# Patient Record
Sex: MALE | Race: WHITE | NOT HISPANIC OR LATINO | Employment: FULL TIME | URBAN - METROPOLITAN AREA
[De-identification: names, ages, dates, MRNs, and addresses within clinical notes are randomized per-mention and may not be internally consistent; named-entity substitution may affect disease eponyms.]

---

## 2017-08-07 ENCOUNTER — TRANSCRIBE ORDERS (OUTPATIENT)
Dept: ADMINISTRATIVE | Facility: HOSPITAL | Age: 52
End: 2017-08-07

## 2017-08-07 ENCOUNTER — APPOINTMENT (OUTPATIENT)
Dept: LAB | Facility: MEDICAL CENTER | Age: 52
End: 2017-08-07
Payer: COMMERCIAL

## 2017-08-07 DIAGNOSIS — Z00.8 ENCOUNTER FOR OTHER GENERAL EXAMINATION: ICD-10-CM

## 2017-08-07 DIAGNOSIS — Z00.8 ENCOUNTER FOR OTHER GENERAL EXAMINATION: Primary | ICD-10-CM

## 2017-08-07 LAB
CHOLEST SERPL-MCNC: 181 MG/DL (ref 50–200)
EST. AVERAGE GLUCOSE BLD GHB EST-MCNC: 117 MG/DL
HBA1C MFR BLD: 5.7 % (ref 4.2–6.3)
HDLC SERPL-MCNC: 52 MG/DL (ref 40–60)
LDLC SERPL CALC-MCNC: 108 MG/DL (ref 0–100)
TRIGL SERPL-MCNC: 106 MG/DL

## 2017-08-07 PROCEDURE — 36415 COLL VENOUS BLD VENIPUNCTURE: CPT

## 2017-08-07 PROCEDURE — 83036 HEMOGLOBIN GLYCOSYLATED A1C: CPT

## 2017-08-07 PROCEDURE — 80061 LIPID PANEL: CPT

## 2017-09-24 ENCOUNTER — OFFICE VISIT (OUTPATIENT)
Dept: URGENT CARE | Facility: MEDICAL CENTER | Age: 52
End: 2017-09-24
Payer: COMMERCIAL

## 2017-09-24 PROCEDURE — G0382 LEV 3 HOSP TYPE B ED VISIT: HCPCS

## 2017-09-24 PROCEDURE — 99283 EMERGENCY DEPT VISIT LOW MDM: CPT

## 2018-08-23 ENCOUNTER — APPOINTMENT (OUTPATIENT)
Dept: LAB | Facility: MEDICAL CENTER | Age: 53
End: 2018-08-23

## 2018-08-23 ENCOUNTER — TRANSCRIBE ORDERS (OUTPATIENT)
Dept: ADMINISTRATIVE | Facility: HOSPITAL | Age: 53
End: 2018-08-23

## 2018-08-23 DIAGNOSIS — Z00.8 ENCOUNTER FOR OTHER GENERAL EXAMINATION: ICD-10-CM

## 2018-08-23 DIAGNOSIS — Z00.8 ENCOUNTER FOR OTHER GENERAL EXAMINATION: Primary | ICD-10-CM

## 2018-08-23 LAB
CHOLEST SERPL-MCNC: 171 MG/DL (ref 50–200)
EST. AVERAGE GLUCOSE BLD GHB EST-MCNC: 111 MG/DL
HBA1C MFR BLD: 5.5 % (ref 4.2–6.3)
HDLC SERPL-MCNC: 57 MG/DL (ref 40–60)
LDLC SERPL CALC-MCNC: 101 MG/DL (ref 0–100)
NONHDLC SERPL-MCNC: 114 MG/DL
TRIGL SERPL-MCNC: 63 MG/DL

## 2018-08-23 PROCEDURE — 80061 LIPID PANEL: CPT

## 2018-08-23 PROCEDURE — 36415 COLL VENOUS BLD VENIPUNCTURE: CPT

## 2018-08-23 PROCEDURE — 83036 HEMOGLOBIN GLYCOSYLATED A1C: CPT

## 2019-08-27 ENCOUNTER — OFFICE VISIT (OUTPATIENT)
Dept: URGENT CARE | Facility: CLINIC | Age: 54
End: 2019-08-27
Payer: COMMERCIAL

## 2019-08-27 VITALS
RESPIRATION RATE: 18 BRPM | BODY MASS INDEX: 37.1 KG/M2 | HEIGHT: 71 IN | WEIGHT: 265 LBS | HEART RATE: 76 BPM | DIASTOLIC BLOOD PRESSURE: 84 MMHG | TEMPERATURE: 100.1 F | OXYGEN SATURATION: 97 % | SYSTOLIC BLOOD PRESSURE: 120 MMHG

## 2019-08-27 DIAGNOSIS — J02.9 PHARYNGITIS, UNSPECIFIED ETIOLOGY: Primary | ICD-10-CM

## 2019-08-27 LAB — S PYO AG THROAT QL: NEGATIVE

## 2019-08-27 PROCEDURE — 87070 CULTURE OTHR SPECIMN AEROBIC: CPT | Performed by: PHYSICIAN ASSISTANT

## 2019-08-27 PROCEDURE — 87880 STREP A ASSAY W/OPTIC: CPT | Performed by: PHYSICIAN ASSISTANT

## 2019-08-27 PROCEDURE — 99213 OFFICE O/P EST LOW 20 MIN: CPT | Performed by: PHYSICIAN ASSISTANT

## 2019-08-27 NOTE — LETTER
August 27, 2019     Patient: Mary Castanon   YOB: 1965   Date of Visit: 8/27/2019       To Whom It May Concern:    Patient seen in office today for acute illness  No work until without fever for 24 hours without having to take anti fever medication           Sincerely,        Dutch Vivar PA-C    CC: No Recipients

## 2019-08-27 NOTE — PROGRESS NOTES
[facility]    NAME: Eric Lacy is a 62 y o  male  : 1965    MRN: [id number]  DATE: 2019  TIME: 3:05 PM    Assessment and Plan   Pharyngitis, unspecified etiology [J02 9]  1  Pharyngitis, unspecified etiology  POCT rapid strepA    Throat culture       Patient Instructions     Patient Instructions   1  Rapid strep test was negative  No antibiotic indicated at this time  2  Throat swab will be sent for definitive culture  Results take approximately 48-72 hours to return  If you have not heard from the provider by the end of 3 business days, please call phone number at top of clinical summary to request the results  3  In the meantime you may do warm salt water gargles every 2-3 hours while awake; use  throat lozenges;  take Tylenol or Ibuprofen (as long as not contraindicated) as needed for sore throat symptoms  4   If significant worsening of throat pain, difficulty breathing, unable to swallow to the point of drooling, or "hot potato" voice proceed to ER for immediate medical attention  5   If sore throat is accompanied by any post nasal drip, nasal congestion, runny nose, sinus pressure, and / or cough may try over the counter cold medicine for symptom relief  These symptoms if they do occur usually peak around 8-10 days then slowly resolve over a couple weeks  Please note, yellow or green mucus does not always / typically mean bacterial infection  It can mean dehydrated mucous or mucous filled with old white blood cells that have been fighting your infection  Rest   Fluids  OTC cough and cold if needed  Chief Complaint     Chief Complaint   Patient presents with    Sore Throat     SOre thraot, fever , chills and over all body aches for the past day       History of Present Illness   Eric Lacy presents to the clinic c/o  61-year-old male woke up yesterday morning with terrible sore throat    Today's some fevers and chills with some postnasal drainage and a little bit of nasal discharge  Body aches and pains  Works at [employer]  No known exposures  Works basically by himself  No history of any travel  Review of Systems   Review of Systems   Constitutional: Positive for activity change, appetite change, chills, fatigue and fever  HENT: Positive for ear pain, postnasal drip, rhinorrhea, sinus pain, sore throat and trouble swallowing  Negative for congestion, ear discharge, facial swelling, sinus pressure and sneezing  Eyes: Negative  Respiratory: Negative  Cardiovascular: Negative  Gastrointestinal: Negative  Musculoskeletal: Positive for back pain and myalgias  Skin: Negative for rash  Hematological: Negative  Current Medications     No long-term medications on file  Current Allergies     Allergies as of 08/27/2019    (No Known Allergies)          The following portions of the patient's history were reviewed and updated as appropriate: allergies, current medications, past family history, past medical history, past social history, past surgical history and problem list   No past medical history on file  No past surgical history on file  No family history on file  Objective   /84   Pulse 76   Temp 100 1 °F (37 8 °C) (Tympanic)   Resp 18   Ht 5' 11" (1 803 m)   Wt 120 kg (265 lb)   SpO2 97%   BMI 36 96 kg/m²   No LMP for male patient  Physical Exam     Physical Exam   Constitutional: He is oriented to person, place, and time  He appears well-developed and well-nourished  Non-toxic appearance  He appears ill  No distress  Appears mildly ill   HENT:   Head: Normocephalic and atraumatic  Right Ear: External ear and ear canal normal  No drainage, swelling or tenderness  No middle ear effusion  Left Ear: External ear and ear canal normal  No drainage, swelling or tenderness  No middle ear effusion  Mouth/Throat: Uvula is midline and mucous membranes are normal  No uvula swelling   Posterior oropharyngeal edema and posterior oropharyngeal erythema present  No oropharyngeal exudate or tonsillar abscesses  No tonsillar exudate  Slight retraction bilateral TMs  Cobblestoning and patchy erythema posterior pharynx   Eyes: Pupils are equal, round, and reactive to light  Conjunctivae and EOM are normal  Right eye exhibits no discharge  Left eye exhibits no discharge  No scleral icterus  Neck: Normal range of motion  Neck supple  No tracheal deviation present  Cardiovascular: Normal rate, regular rhythm, normal heart sounds and intact distal pulses  Exam reveals no gallop and no friction rub  No murmur heard  Pulmonary/Chest: Effort normal and breath sounds normal  No stridor  No respiratory distress  He has no wheezes  He has no rhonchi  He has no rales  Lymphadenopathy:     He has no cervical adenopathy  Neurological: He is alert and oriented to person, place, and time  Skin: Skin is warm and dry  No rash noted  He is not diaphoretic  Psychiatric: He has a normal mood and affect

## 2019-08-27 NOTE — PATIENT INSTRUCTIONS
1  Rapid strep test was negative  No antibiotic indicated at this time  2  Throat swab will be sent for definitive culture  Results take approximately 48-72 hours to return  If you have not heard from the provider by the end of 3 business days, please call phone number at top of clinical summary to request the results  3  In the meantime you may do warm salt water gargles every 2-3 hours while awake; use  throat lozenges;  take Tylenol or Ibuprofen (as long as not contraindicated) as needed for sore throat symptoms  4   If significant worsening of throat pain, difficulty breathing, unable to swallow to the point of drooling, or "hot potato" voice proceed to ER for immediate medical attention  5   If sore throat is accompanied by any post nasal drip, nasal congestion, runny nose, sinus pressure, and / or cough may try over the counter cold medicine for symptom relief  These symptoms if they do occur usually peak around 8-10 days then slowly resolve over a couple weeks  Please note, yellow or green mucus does not always / typically mean bacterial infection  It can mean dehydrated mucous or mucous filled with old white blood cells that have been fighting your infection  Rest   Fluids  OTC cough and cold if needed

## 2019-08-29 LAB — BACTERIA THROAT CULT: NORMAL

## 2019-08-30 ENCOUNTER — TELEPHONE (OUTPATIENT)
Dept: URGENT CARE | Facility: CLINIC | Age: 54
End: 2019-08-30

## 2020-01-23 ENCOUNTER — OFFICE VISIT (OUTPATIENT)
Dept: URGENT CARE | Facility: CLINIC | Age: 55
End: 2020-01-23
Payer: COMMERCIAL

## 2020-01-23 VITALS
HEART RATE: 63 BPM | TEMPERATURE: 97.4 F | HEIGHT: 71 IN | WEIGHT: 281 LBS | DIASTOLIC BLOOD PRESSURE: 75 MMHG | BODY MASS INDEX: 39.34 KG/M2 | OXYGEN SATURATION: 96 % | RESPIRATION RATE: 20 BRPM | SYSTOLIC BLOOD PRESSURE: 115 MMHG

## 2020-01-23 DIAGNOSIS — H60.392 OTHER INFECTIVE ACUTE OTITIS EXTERNA OF LEFT EAR: Primary | ICD-10-CM

## 2020-01-23 PROCEDURE — 99213 OFFICE O/P EST LOW 20 MIN: CPT | Performed by: NURSE PRACTITIONER

## 2020-01-23 RX ORDER — CEPHALEXIN 500 MG/1
500 CAPSULE ORAL EVERY 8 HOURS SCHEDULED
Qty: 21 CAPSULE | Refills: 0 | Status: SHIPPED | OUTPATIENT
Start: 2020-01-23 | End: 2020-01-30

## 2020-01-23 RX ORDER — NEOMYCIN SULFATE, POLYMYXIN B SULFATE AND HYDROCORTISONE 10; 3.5; 1 MG/ML; MG/ML; [USP'U]/ML
4 SUSPENSION/ DROPS AURICULAR (OTIC) 4 TIMES DAILY
Qty: 10 ML | Refills: 0 | Status: SHIPPED | OUTPATIENT
Start: 2020-01-23 | End: 2020-01-30

## 2020-01-23 NOTE — PATIENT INSTRUCTIONS
Otitis Externa   WHAT YOU NEED TO KNOW:   Otitis externa, or swimmer's ear, is an infection in the outer ear canal  This canal goes from the outside of the ear to the eardrum  DISCHARGE INSTRUCTIONS:   Return to the emergency department if:   · You have severe ear pain  · You are suddenly unable to hear at all  · You have new swelling in your face, behind your ears, or in your neck  · You suddenly cannot move part of your face  · Your face suddenly feels numb  Contact your healthcare provider if:   · You have a fever  · Your signs and symptoms do not get better after 2 days of treatment  · Your signs and symptoms go away for a time, but then come back  · You have questions or concerns about your condition or care  Medicines:   · NSAIDs , such as ibuprofen, help decrease swelling, pain, and fever  This medicine is available with or without a doctor's order  NSAIDs can cause stomach bleeding or kidney problems in certain people  If you take blood thinner medicine, always ask if NSAIDs are safe for you  Always read the medicine label and follow directions  Do not give these medicines to children under 10months of age without direction from your child's healthcare provider  · Acetaminophen  decreases pain and fever  It is available without a doctor's order  Ask how much to take and how often to take it  Follow directions  Acetaminophen can cause liver damage if not taken correctly  · Ear drops  that contain an antibiotic may be given  The antibiotic helps treat a bacterial infection  You may also be given steroid medicine  The steroid helps decrease redness, swelling, and pain  · Take your medicine as directed  Contact your healthcare provider if you think your medicine is not helping or if you have side effects  Tell him or her if you are allergic to any medicine  Keep a list of the medicines, vitamins, and herbs you take  Include the amounts, and when and why you take them  Bring the list or the pill bottles to follow-up visits  Carry your medicine list with you in case of an emergency  Follow up with your healthcare provider as directed:  Write down your questions so you remember to ask them during your visits  How to use eardrops:   · Lie down on your side with your infected ear facing up  · Carefully drip the correct number of eardrops into your ear  Have another person help you if possible  · Gently move the outside part of your ear back and forth to help the medicine reach your ear canal      · Stay lying down in the same position (with your ear facing up) for 3 to 5 minutes  Prevent otitis externa:   · Do not put cotton swabs or foreign objects in your ears  · Wrap a clean moist washcloth around your finger, and use it to clean your outer ear and remove extra ear wax  · Use ear plugs when you swim  Dry your outer ears completely after you swim or bathe  © 2017 [employer]  Information is for End User's use only and may not be sold, redistributed or otherwise used for commercial purposes  All illustrations and images included in CareNotes® are the copyrighted property of [employer]  or [employer]  The above information is an  only  It is not intended as medical advice for individual conditions or treatments  Talk to your doctor, nurse or pharmacist before following any medical regimen to see if it is safe and effective for you

## 2020-01-23 NOTE — PROGRESS NOTES
[facility]        NAME: Kaci Calix is a 62 y o  male  : 1965    MRN: [id number]  DATE: 2020  TIME: 6:41 PM    Assessment and Plan   Other infective acute otitis externa of left ear [H60 392]  1  Other infective acute otitis externa of left ear  neomycin-polymyxin-hydrocortisone (CORTISPORIN) 0 35%-10,000 units/mL-1% otic suspension    cephalexin (KEFLEX) 500 mg capsule     Start course of ear drops and oral antibiotics  rec clean ear plugs while at work   F/u with pcp or ER with worsening symptoms  Pt in agreement with plan     Patient Instructions     Follow up with PCP in 3-5 days  Proceed to  ER if symptoms worsen  Chief Complaint     Chief Complaint   Patient presents with   Sam Edward     Patient here with throbbing pain and itching in his left ear for two days  History of Present Illness   Kaci Calix presents to the clinic c/o    Pt states 2 days ago - ear started to bother him - painful on the side and started to itch  Then started to be tender, painful and tender  External ear  Works at [employer]   No fevers, chills, neck pain  No recent URIs      Review of Systems   Review of Systems   All other systems reviewed and are negative          Current Medications     Long-Term Medications   Medication Sig Dispense Refill    neomycin-polymyxin-hydrocortisone (CORTISPORIN) 0 35%-10,000 units/mL-1% otic suspension Administer 4 drops into the left ear 4 (four) times a day for 7 days 10 mL 0       Current Allergies     Allergies as of 2020    (No Known Allergies)            The following portions of the patient's history were reviewed and updated as appropriate: allergies, current medications, past family history, past medical history, past social history, past surgical history and problem list     Objective   /75 (BP Location: Left arm, Patient Position: Sitting, Cuff Size: Large)   Pulse 63   Temp (!) 97 4 °F (36 3 °C) (Tympanic)   Resp 20  5' 11" (1 803 m)   Wt 127 kg (281 lb)   SpO2 96%   BMI 39 19 kg/m²        Physical Exam     Physical Exam   Constitutional: Vital signs are normal  He appears well-developed and well-nourished  He is active  HENT:   Head: Normocephalic and atraumatic  Right Ear: Hearing, tympanic membrane, external ear and ear canal normal    Left Ear: Hearing, tympanic membrane and ear canal normal  There is swelling (and erythema) and tenderness (of canal and external ear)  Eyes: Pupils are equal, round, and reactive to light  Conjunctivae and lids are normal    Cardiovascular: Normal rate, regular rhythm, S1 normal, S2 normal and normal heart sounds  Pulmonary/Chest: Effort normal and breath sounds normal    Skin: Skin is warm, dry and intact  Psychiatric: He has a normal mood and affect  His speech is normal and behavior is normal  Judgment and thought content normal  Cognition and memory are normal    Nursing note and vitals reviewed

## 2020-01-27 ENCOUNTER — OFFICE VISIT (OUTPATIENT)
Dept: URGENT CARE | Facility: CLINIC | Age: 55
End: 2020-01-27
Payer: COMMERCIAL

## 2020-01-27 VITALS
OXYGEN SATURATION: 94 % | HEIGHT: 71 IN | SYSTOLIC BLOOD PRESSURE: 133 MMHG | WEIGHT: 281 LBS | TEMPERATURE: 98.1 F | DIASTOLIC BLOOD PRESSURE: 69 MMHG | BODY MASS INDEX: 39.34 KG/M2 | RESPIRATION RATE: 18 BRPM | HEART RATE: 79 BPM

## 2020-01-27 DIAGNOSIS — H60.502 ACUTE OTITIS EXTERNA OF LEFT EAR, UNSPECIFIED TYPE: Primary | ICD-10-CM

## 2020-01-27 PROCEDURE — 99213 OFFICE O/P EST LOW 20 MIN: CPT | Performed by: NURSE PRACTITIONER

## 2020-01-27 RX ORDER — SULFAMETHOXAZOLE AND TRIMETHOPRIM 800; 160 MG/1; MG/1
1 TABLET ORAL EVERY 12 HOURS SCHEDULED
Qty: 14 TABLET | Refills: 0 | Status: SHIPPED | OUTPATIENT
Start: 2020-01-27 | End: 2020-02-03

## 2020-01-27 NOTE — PATIENT INSTRUCTIONS
Rec f/u with ENT     Otitis Externa   WHAT YOU NEED TO KNOW:   Otitis externa, or swimmer's ear, is an infection in the outer ear canal  This canal goes from the outside of the ear to the eardrum  DISCHARGE INSTRUCTIONS:   Return to the emergency department if:   · You have severe ear pain  · You are suddenly unable to hear at all  · You have new swelling in your face, behind your ears, or in your neck  · You suddenly cannot move part of your face  · Your face suddenly feels numb  Contact your healthcare provider if:   · You have a fever  · Your signs and symptoms do not get better after 2 days of treatment  · Your signs and symptoms go away for a time, but then come back  · You have questions or concerns about your condition or care  Medicines:   · NSAIDs , such as ibuprofen, help decrease swelling, pain, and fever  This medicine is available with or without a doctor's order  NSAIDs can cause stomach bleeding or kidney problems in certain people  If you take blood thinner medicine, always ask if NSAIDs are safe for you  Always read the medicine label and follow directions  Do not give these medicines to children under 10months of age without direction from your child's healthcare provider  · Acetaminophen  decreases pain and fever  It is available without a doctor's order  Ask how much to take and how often to take it  Follow directions  Acetaminophen can cause liver damage if not taken correctly  · Ear drops  that contain an antibiotic may be given  The antibiotic helps treat a bacterial infection  You may also be given steroid medicine  The steroid helps decrease redness, swelling, and pain  · Take your medicine as directed  Contact your healthcare provider if you think your medicine is not helping or if you have side effects  Tell him or her if you are allergic to any medicine  Keep a list of the medicines, vitamins, and herbs you take   Include the amounts, and when and why you take them  Bring the list or the pill bottles to follow-up visits  Carry your medicine list with you in case of an emergency  Follow up with your healthcare provider as directed:  Write down your questions so you remember to ask them during your visits  How to use eardrops:   · Lie down on your side with your infected ear facing up  · Carefully drip the correct number of eardrops into your ear  Have another person help you if possible  · Gently move the outside part of your ear back and forth to help the medicine reach your ear canal      · Stay lying down in the same position (with your ear facing up) for 3 to 5 minutes  Prevent otitis externa:   · Do not put cotton swabs or foreign objects in your ears  · Wrap a clean moist washcloth around your finger, and use it to clean your outer ear and remove extra ear wax  · Use ear plugs when you swim  Dry your outer ears completely after you swim or bathe  © 2017 [facility] Information is for End User's use only and may not be sold, redistributed or otherwise used for commercial purposes  All illustrations and images included in CareNotes® are the copyrighted property of [employer]  or [employer]  The above information is an  only  It is not intended as medical advice for individual conditions or treatments  Talk to your doctor, nurse or pharmacist before following any medical regimen to see if it is safe and effective for you

## 2020-01-27 NOTE — PROGRESS NOTES
[email or web address]        NAME: Merle Amanda is a 62 y o  male  : 1965    MRN: [id number]  DATE: 2020  TIME: 12:41 PM    Assessment and Plan   Acute otitis externa of left ear, unspecified type [H60 502]  1  Acute otitis externa of left ear, unspecified type  sulfamethoxazole-trimethoprim (BACTRIM DS) 800-160 mg per tablet     Stop keflex - change to bactrim   No evidence of shingles   If no improvement f/u with ent   Pt in agreement with plan of care  Patient Instructions     Follow up with PCP in 3-5 days  Proceed to  ER if symptoms worsen  Chief Complaint     Chief Complaint   Patient presents with   Carmel Peeling     Patient was here on Thursday night for an ear ache  He is on drops and an antibiotic and it doesn't seem to be helping  Patient states at night it is the worst and he has shooting sharp comfort  History of Present Illness   Merle Amanda presents to the clinic c/o    Patient was here on Thursday night for an ear ache  He is on drops and an antibiotic and it doesn't seem to be helping  Patient states at night it is the worst and he has shooting sharp pain      Review of Systems   Review of Systems   All other systems reviewed and are negative          Current Medications     Long-Term Medications   Medication Sig Dispense Refill    neomycin-polymyxin-hydrocortisone (CORTISPORIN) 0 35%-10,000 units/mL-1% otic suspension Administer 4 drops into the left ear 4 (four) times a day for 7 days 10 mL 0       Current Allergies     Allergies as of 2020    (No Known Allergies)            The following portions of the patient's history were reviewed and updated as appropriate: allergies, current medications, past family history, past medical history, past social history, past surgical history and problem list     Objective   /69 (BP Location: Left arm, Patient Position: Sitting, Cuff Size: Extra-Large)   Pulse 79   Temp 98 1 °F (36 7 °C)   Resp 18   Ht 5' 11" (1 803 m)   Wt 127 kg (281 lb)   SpO2 94%   BMI 39 19 kg/m²        Physical Exam     Physical Exam   Constitutional: Vital signs are normal  He appears well-developed and well-nourished  He is active  HENT:   Head: Normocephalic and atraumatic  Right Ear: Hearing, tympanic membrane, external ear and ear canal normal    Left Ear: Hearing, tympanic membrane, external ear and ear canal normal  There is swelling (canal and external ear)  Eyes: Pupils are equal, round, and reactive to light  Conjunctivae and lids are normal    Cardiovascular: Normal rate, regular rhythm, S1 normal, S2 normal and normal heart sounds  Pulmonary/Chest: Effort normal and breath sounds normal    Skin: Skin is warm, dry and intact  Psychiatric: He has a normal mood and affect  His speech is normal and behavior is normal  Judgment and thought content normal  Cognition and memory are normal    Nursing note and vitals reviewed

## 2021-09-04 ENCOUNTER — APPOINTMENT (OUTPATIENT)
Dept: LAB | Facility: CLINIC | Age: 56
End: 2021-09-04

## 2021-09-04 DIAGNOSIS — Z00.8 HEALTH EXAMINATION IN POPULATION SURVEY: ICD-10-CM

## 2021-09-04 LAB
CHOLEST SERPL-MCNC: 180 MG/DL (ref 50–200)
EST. AVERAGE GLUCOSE BLD GHB EST-MCNC: 111 MG/DL
HBA1C MFR BLD: 5.5 %
HDLC SERPL-MCNC: 52 MG/DL
LDLC SERPL CALC-MCNC: 111 MG/DL (ref 0–100)
NONHDLC SERPL-MCNC: 128 MG/DL
TRIGL SERPL-MCNC: 83 MG/DL

## 2021-09-04 PROCEDURE — 80061 LIPID PANEL: CPT

## 2021-09-04 PROCEDURE — 83036 HEMOGLOBIN GLYCOSYLATED A1C: CPT

## 2021-09-04 PROCEDURE — 36415 COLL VENOUS BLD VENIPUNCTURE: CPT

## 2021-10-05 ENCOUNTER — OFFICE VISIT (OUTPATIENT)
Dept: URGENT CARE | Facility: CLINIC | Age: 56
End: 2021-10-05
Payer: COMMERCIAL

## 2021-10-05 VITALS
HEIGHT: 71 IN | OXYGEN SATURATION: 96 % | HEART RATE: 75 BPM | WEIGHT: 270 LBS | TEMPERATURE: 96.3 F | RESPIRATION RATE: 16 BRPM | BODY MASS INDEX: 37.8 KG/M2

## 2021-10-05 DIAGNOSIS — J01.90 ACUTE SINUSITIS, RECURRENCE NOT SPECIFIED, UNSPECIFIED LOCATION: Primary | ICD-10-CM

## 2021-10-05 PROCEDURE — 99213 OFFICE O/P EST LOW 20 MIN: CPT | Performed by: PHYSICIAN ASSISTANT

## 2021-10-05 RX ORDER — AMOXICILLIN 500 MG/1
500 TABLET, FILM COATED ORAL 3 TIMES DAILY
Qty: 30 TABLET | Refills: 0 | Status: SHIPPED | OUTPATIENT
Start: 2021-10-05 | End: 2021-10-15